# Patient Record
Sex: MALE | Race: BLACK OR AFRICAN AMERICAN | NOT HISPANIC OR LATINO | URBAN - METROPOLITAN AREA
[De-identification: names, ages, dates, MRNs, and addresses within clinical notes are randomized per-mention and may not be internally consistent; named-entity substitution may affect disease eponyms.]

---

## 2019-05-22 ENCOUNTER — EMERGENCY (EMERGENCY)
Facility: HOSPITAL | Age: 39
LOS: 1 days | Discharge: HOME | End: 2019-05-22
Attending: EMERGENCY MEDICINE | Admitting: EMERGENCY MEDICINE
Payer: COMMERCIAL

## 2019-05-22 VITALS
DIASTOLIC BLOOD PRESSURE: 90 MMHG | HEART RATE: 72 BPM | TEMPERATURE: 98 F | OXYGEN SATURATION: 100 % | SYSTOLIC BLOOD PRESSURE: 134 MMHG | RESPIRATION RATE: 16 BRPM

## 2019-05-22 VITALS
OXYGEN SATURATION: 99 % | DIASTOLIC BLOOD PRESSURE: 78 MMHG | RESPIRATION RATE: 17 BRPM | HEART RATE: 77 BPM | SYSTOLIC BLOOD PRESSURE: 123 MMHG

## 2019-05-22 LAB
ALBUMIN SERPL ELPH-MCNC: 4.9 G/DL — SIGNIFICANT CHANGE UP (ref 3.3–5)
ALP SERPL-CCNC: 95 U/L — SIGNIFICANT CHANGE UP (ref 40–120)
ALT FLD-CCNC: 15 U/L — SIGNIFICANT CHANGE UP (ref 4–41)
ANION GAP SERPL CALC-SCNC: 12 MMO/L — SIGNIFICANT CHANGE UP (ref 7–14)
APPEARANCE UR: CLEAR — SIGNIFICANT CHANGE UP
AST SERPL-CCNC: 31 U/L — SIGNIFICANT CHANGE UP (ref 4–40)
BASE EXCESS BLDV CALC-SCNC: 2.6 MMOL/L — SIGNIFICANT CHANGE UP
BASOPHILS # BLD AUTO: 0.04 K/UL — SIGNIFICANT CHANGE UP (ref 0–0.2)
BASOPHILS NFR BLD AUTO: 1 % — SIGNIFICANT CHANGE UP (ref 0–2)
BILIRUB SERPL-MCNC: 0.2 MG/DL — SIGNIFICANT CHANGE UP (ref 0.2–1.2)
BILIRUB UR-MCNC: NEGATIVE — SIGNIFICANT CHANGE UP
BLOOD GAS VENOUS - CREATININE: SIGNIFICANT CHANGE UP MG/DL (ref 0.5–1.3)
BLOOD UR QL VISUAL: NEGATIVE — SIGNIFICANT CHANGE UP
BUN SERPL-MCNC: 16 MG/DL — SIGNIFICANT CHANGE UP (ref 7–23)
CALCIUM SERPL-MCNC: 9.8 MG/DL — SIGNIFICANT CHANGE UP (ref 8.4–10.5)
CHLORIDE BLDV-SCNC: 105 MMOL/L — SIGNIFICANT CHANGE UP (ref 96–108)
CHLORIDE SERPL-SCNC: 102 MMOL/L — SIGNIFICANT CHANGE UP (ref 98–107)
CO2 SERPL-SCNC: 25 MMOL/L — SIGNIFICANT CHANGE UP (ref 22–31)
COLOR SPEC: SIGNIFICANT CHANGE UP
CREAT SERPL-MCNC: 1.36 MG/DL — HIGH (ref 0.5–1.3)
EOSINOPHIL # BLD AUTO: 0.16 K/UL — SIGNIFICANT CHANGE UP (ref 0–0.5)
EOSINOPHIL NFR BLD AUTO: 4 % — SIGNIFICANT CHANGE UP (ref 0–6)
GAS PNL BLDV: 148 MMOL/L — HIGH (ref 136–146)
GLUCOSE BLDV-MCNC: 103 MG/DL — HIGH (ref 70–99)
GLUCOSE SERPL-MCNC: 98 MG/DL — SIGNIFICANT CHANGE UP (ref 70–99)
GLUCOSE UR-MCNC: NEGATIVE — SIGNIFICANT CHANGE UP
HCO3 BLDV-SCNC: 25 MMOL/L — SIGNIFICANT CHANGE UP (ref 20–27)
HCT VFR BLD CALC: 42 % — SIGNIFICANT CHANGE UP (ref 39–50)
HCT VFR BLDV CALC: 40.1 % — SIGNIFICANT CHANGE UP (ref 39–51)
HGB BLD-MCNC: 12.9 G/DL — LOW (ref 13–17)
HGB BLDV-MCNC: 13 G/DL — SIGNIFICANT CHANGE UP (ref 13–17)
IMM GRANULOCYTES NFR BLD AUTO: 0 % — SIGNIFICANT CHANGE UP (ref 0–1.5)
KETONES UR-MCNC: NEGATIVE — SIGNIFICANT CHANGE UP
LACTATE BLDV-MCNC: 1.9 MMOL/L — SIGNIFICANT CHANGE UP (ref 0.5–2)
LEUKOCYTE ESTERASE UR-ACNC: NEGATIVE — SIGNIFICANT CHANGE UP
LIDOCAIN IGE QN: 38.9 U/L — SIGNIFICANT CHANGE UP (ref 7–60)
LYMPHOCYTES # BLD AUTO: 1.53 K/UL — SIGNIFICANT CHANGE UP (ref 1–3.3)
LYMPHOCYTES # BLD AUTO: 38.6 % — SIGNIFICANT CHANGE UP (ref 13–44)
MCHC RBC-ENTMCNC: 22.4 PG — LOW (ref 27–34)
MCHC RBC-ENTMCNC: 30.7 % — LOW (ref 32–36)
MCV RBC AUTO: 73 FL — LOW (ref 80–100)
MONOCYTES # BLD AUTO: 0.33 K/UL — SIGNIFICANT CHANGE UP (ref 0–0.9)
MONOCYTES NFR BLD AUTO: 8.3 % — SIGNIFICANT CHANGE UP (ref 2–14)
NEUTROPHILS # BLD AUTO: 1.9 K/UL — SIGNIFICANT CHANGE UP (ref 1.8–7.4)
NEUTROPHILS NFR BLD AUTO: 48.1 % — SIGNIFICANT CHANGE UP (ref 43–77)
NITRITE UR-MCNC: NEGATIVE — SIGNIFICANT CHANGE UP
NRBC # FLD: 0 K/UL — SIGNIFICANT CHANGE UP (ref 0–0)
PCO2 BLDV: 48 MMHG — SIGNIFICANT CHANGE UP (ref 41–51)
PH BLDV: 7.38 PH — SIGNIFICANT CHANGE UP (ref 7.32–7.43)
PH UR: 7 — SIGNIFICANT CHANGE UP (ref 5–8)
PLATELET # BLD AUTO: 311 K/UL — SIGNIFICANT CHANGE UP (ref 150–400)
PMV BLD: 9 FL — SIGNIFICANT CHANGE UP (ref 7–13)
PO2 BLDV: 23 MMHG — LOW (ref 35–40)
POTASSIUM BLDV-SCNC: 4.7 MMOL/L — HIGH (ref 3.4–4.5)
POTASSIUM SERPL-MCNC: 4.6 MMOL/L — SIGNIFICANT CHANGE UP (ref 3.5–5.3)
POTASSIUM SERPL-SCNC: 4.6 MMOL/L — SIGNIFICANT CHANGE UP (ref 3.5–5.3)
PROT SERPL-MCNC: 7.7 G/DL — SIGNIFICANT CHANGE UP (ref 6–8.3)
PROT UR-MCNC: NEGATIVE — SIGNIFICANT CHANGE UP
RBC # BLD: 5.75 M/UL — SIGNIFICANT CHANGE UP (ref 4.2–5.8)
RBC # FLD: 14.8 % — HIGH (ref 10.3–14.5)
SAO2 % BLDV: 39.5 % — LOW (ref 60–85)
SODIUM SERPL-SCNC: 139 MMOL/L — SIGNIFICANT CHANGE UP (ref 135–145)
SP GR SPEC: 1.01 — SIGNIFICANT CHANGE UP (ref 1–1.04)
UROBILINOGEN FLD QL: NORMAL — SIGNIFICANT CHANGE UP
WBC # BLD: 3.96 K/UL — SIGNIFICANT CHANGE UP (ref 3.8–10.5)
WBC # FLD AUTO: 3.96 K/UL — SIGNIFICANT CHANGE UP (ref 3.8–10.5)

## 2019-05-22 PROCEDURE — 74176 CT ABD & PELVIS W/O CONTRAST: CPT | Mod: 26

## 2019-05-22 PROCEDURE — 99284 EMERGENCY DEPT VISIT MOD MDM: CPT

## 2019-05-22 RX ORDER — LIDOCAINE 4 G/100G
1 CREAM TOPICAL ONCE
Refills: 0 | Status: COMPLETED | OUTPATIENT
Start: 2019-05-22 | End: 2019-05-22

## 2019-05-22 RX ORDER — SODIUM CHLORIDE 9 MG/ML
500 INJECTION INTRAMUSCULAR; INTRAVENOUS; SUBCUTANEOUS ONCE
Refills: 0 | Status: COMPLETED | OUTPATIENT
Start: 2019-05-22 | End: 2019-05-22

## 2019-05-22 RX ORDER — KETOROLAC TROMETHAMINE 30 MG/ML
15 SYRINGE (ML) INJECTION ONCE
Refills: 0 | Status: DISCONTINUED | OUTPATIENT
Start: 2019-05-22 | End: 2019-05-22

## 2019-05-22 RX ORDER — SODIUM CHLORIDE 9 MG/ML
1000 INJECTION INTRAMUSCULAR; INTRAVENOUS; SUBCUTANEOUS ONCE
Refills: 0 | Status: COMPLETED | OUTPATIENT
Start: 2019-05-22 | End: 2019-05-22

## 2019-05-22 RX ORDER — LIDOCAINE 4 G/100G
CREAM TOPICAL
Refills: 0 | Status: COMPLETED | OUTPATIENT
Start: 2019-05-22 | End: 2019-05-22

## 2019-05-22 RX ADMIN — SODIUM CHLORIDE 1500 MILLILITER(S): 9 INJECTION INTRAMUSCULAR; INTRAVENOUS; SUBCUTANEOUS at 14:20

## 2019-05-22 RX ADMIN — LIDOCAINE 1 PATCH: 4 CREAM TOPICAL at 12:01

## 2019-05-22 NOTE — ED PROVIDER NOTE - NS_ ATTENDINGSCRIBEDETAILS _ED_A_ED_FT
The scribe's documentation has been prepared under my direction and personally reviewed by me, Ivanna Deluna MD, in its entirety. I confirm that the note above accurately reflects all work, treatment, procedures, and medical decision making performed by me.

## 2019-05-22 NOTE — ED ADULT NURSE NOTE - OBJECTIVE STATEMENT
Receive pt. in Intake room 10b alert and oriented x 4 presenting to the ER with complaints of right flank pain. Pt. has a history of Asthma and stated " I have been having this pain in my right side and right lower back for a year now, I went to the doctor they did an ultrasound and blood work and told me everything was fine but it never go away I have pain all the time". Medicated as ordered labs sent, no c/o nausea no vomiting , ambulating to bathroom will continue to monitor.

## 2019-05-22 NOTE — ED ADULT TRIAGE NOTE - CHIEF COMPLAINT QUOTE
states " I am having right side pain with some swelling to the side since 2 days ". c/o pain to right flank. denies any urinary symptoms. denies fever/injury

## 2019-05-22 NOTE — ED PROVIDER NOTE - PHYSICAL EXAMINATION
Pt is non tender, no CVA tenderness, pt states on exam pain is in t7-t8 region to right sided mid clavicular line, without any vertebral point ttp, or bony pt tenderness, no muscle spasm or palpable trigger points, no focal tenderness or spasm appreciated.

## 2019-05-22 NOTE — ED PROVIDER NOTE - OBJECTIVE STATEMENT
40 YO M with c/o rt mid back/flak pain in the mid to lower thoracic region. Pt states symptoms have been intermittent for years but pt states wo for apst 2 days. Woke im form sleep last 2 nights at 3 3:30 Am, pain 7/10 in severity at its worse dec to 3/10 at its best. Pain is non radiating, pt desc it as a cramp or needle sticking. Pt denie any trauma any urinary sx , f, c, n vomiting. Pt st hes been dieting and xercising asince xmas and has had loose st since that time often assoc with yogurt. Denies CP sob leg swllign or pain, HA dizziness, vi chjaneges or other concefning sx. 38 YO M with c/o rt mid back/flak pain in the mid to lower thoracic region. Pt states symptoms have been intermittent for years but pt states worsened for past 2 days. Woke him from sleep last 2 nights at 3:30 AM, pain 7/10 in severity at its worse decreased to 3/10 at its best. Pain is non radiating, pt described it as a cramp or needle sticking. Pt denies any trauma any urinary symptoms, fever, chills, nausea or vomiting. Pt states he has been dieting and exercising since [place] and has had loose stools since that time often associated with yogurt. Denies CP, SOB, leg swelling or pain, HA dizziness, visual changes or other concerning symptoms. Family hx of kidney stones. Pt is concerned for kidney stone, with family hx significant for kidney stone in his entire paternal side. 40 YO M with c/o rt mid back/flank pain in the mid to lower thoracic region. Pt states symptoms have been intermittent for years but pt states worsened for past 2 days. Woke him from sleep last 2 nights at 3:30 AM, pain 7/10 in severity at its worse decreased to 3/10 at its best. Pain is non radiating, pt described it as a cramp or needle sticking. Pt denies any trauma any urinary symptoms, fever, chills, nausea or vomiting. Pt states he has been dieting and exercising since Christmas and has had loose stools since that time often associated with yogurt. Denies CP, SOB, leg swelling or pain, HA dizziness, visual changes or other concerning symptoms. Family hx of kidney stones. Pt is concerned for kidney stone, with family hx significant for kidney stone in his entire paternal side.

## 2019-05-22 NOTE — ED PROVIDER NOTE - NSFOLLOWUPCLINICS_GEN_ALL_ED_FT
no
[facility]  Sports Medicine  [address] [place] [address]  Phone: [phone]  Fax:   Follow Up Time: 7-10 Days

## 2019-05-22 NOTE — ED PROVIDER NOTE - CARE PLAN
Principal Discharge DX:	Back pain  Assessment and plan of treatment:	Lidoderm patch removed since patient states sx worse at night.

## 2019-05-22 NOTE — ED PROVIDER NOTE - CLINICAL SUMMARY MEDICAL DECISION MAKING FREE TEXT BOX
Pt is non tender, no CVA t, pt states on exam paini s in  t7-t8 regioion to right sided mid clavicular line, without any vertebral pt t tp, o bony pt t, no mus spa or palpabnel trigger points, no focal tenderness or spasm appreciated.   family hx of kidney st, Pt is concen reg kid fxn, with fam hx sign for kid st in his entire paternal side. IV fuids, labs and CT non contrast. Will consider Toradol if cr is within normal. urine studies. IV fuids, labs and CT non contrast. Will consider Toradol if cr is within normal. urine studies. RIGHT mid back pain-  FHx of kidney stones-IV fluids, labs and CT non contrast. Will consider Toradol if cr is within normal. urine studies.

## 2019-05-22 NOTE — ED PROVIDER NOTE - PROGRESS NOTE DETAILS
RONY AGUILAR, MD: Pain is much better.  Patient refused Toradol secondary to pain relieved.  Patient made aware of red board status regarding abn WBC or Bands in stage III severe sepsis criteria without any abnormal result.  Hematology consulted without abnormal result.  Additional 500cc NS ordered secondary to cr 1.36. DISCHARGE NOTE-RONY DELUNA MD   Patient feeling better and wants to go home.  Patient is awake and alert and in no acute distress.  Patient understands that they develop new or worsening symptoms, to return to the ER immediately.  Follow up with PMD or call [phone] or the medical clinic to obtain appropriate follow up.  Stable for discharge.    -Rony Deluna MD

## 2019-05-22 NOTE — ED PROVIDER NOTE - NSFOLLOWUPINSTRUCTIONS_ED_ALL_ED_FT
Lidoderm to area at 10pm and remove at 7am.  After tonight wear for 12 hours at night and off during the day.      Instructions for follow-up, activity and diet: Rest, drink plenty of fluids.  Advance activity as tolerated.  Continue all previously prescribed medications as directed.  Follow up with doctors as recommended - bring copies of your results.  Return to the ER for worsening or persistent symptoms, including but not limited to fevers, headaches, numbness, weakness, vision changes, double vision, worsening pain or inability to tolerate oral medications and/or ANY NEW OR CONCERNING SYMPTOMS. If you have issues obtaining follow up, please call: [phone] to obtain a doctor or specialist who takes your insurance in your area.

## 2019-05-23 LAB — SPECIMEN SOURCE: SIGNIFICANT CHANGE UP

## 2019-05-24 LAB — BACTERIA UR CULT: SIGNIFICANT CHANGE UP

## 2020-05-24 NOTE — ED PROVIDER NOTE - NSCAREINITIATED _GEN_ER
Ivanna Deluna(Attending) Implemented All Universal Safety Interventions:  [place] to call system. Call bell, personal items and telephone within reach. Instruct patient to call for assistance. Room bathroom lighting operational. Non-slip footwear when patient is off stretcher. Physically safe environment: no spills, clutter or unnecessary equipment. Stretcher in lowest position, wheels locked, appropriate side rails in place.

## 2023-08-31 NOTE — ED ADULT NURSE NOTE - PAIN RATING/NUMBER SCALE (0-10): REST
David Vargas,  All I have next Tuesday for dr warner is an 8:30 am time.  I do not know if the patient will want that.  If not can I have Dr. Hernandez do 9-8-23?  Please advise.    Thanks  Holly     4